# Patient Record
Sex: MALE | Race: BLACK OR AFRICAN AMERICAN | URBAN - METROPOLITAN AREA
[De-identification: names, ages, dates, MRNs, and addresses within clinical notes are randomized per-mention and may not be internally consistent; named-entity substitution may affect disease eponyms.]

---

## 2018-04-16 ENCOUNTER — OFFICE VISIT (OUTPATIENT)
Dept: INTERNAL MEDICINE CLINIC | Age: 28
End: 2018-04-16

## 2018-04-16 VITALS
OXYGEN SATURATION: 96 % | HEIGHT: 71 IN | SYSTOLIC BLOOD PRESSURE: 171 MMHG | TEMPERATURE: 98.1 F | HEART RATE: 70 BPM | RESPIRATION RATE: 16 BRPM | WEIGHT: 295.4 LBS | DIASTOLIC BLOOD PRESSURE: 101 MMHG | BODY MASS INDEX: 41.35 KG/M2

## 2018-04-16 DIAGNOSIS — E66.01 CLASS 3 SEVERE OBESITY DUE TO EXCESS CALORIES WITHOUT SERIOUS COMORBIDITY WITH BODY MASS INDEX (BMI) OF 40.0 TO 44.9 IN ADULT (HCC): ICD-10-CM

## 2018-04-16 DIAGNOSIS — Z00.00 PHYSICAL EXAM: ICD-10-CM

## 2018-04-16 DIAGNOSIS — J30.1 SEASONAL ALLERGIC RHINITIS DUE TO POLLEN: ICD-10-CM

## 2018-04-16 DIAGNOSIS — I10 HYPERTENSION, UNSPECIFIED TYPE: Primary | ICD-10-CM

## 2018-04-16 RX ORDER — AMLODIPINE BESYLATE 5 MG/1
5 TABLET ORAL DAILY
Qty: 30 TAB | Refills: 11 | Status: SHIPPED | OUTPATIENT
Start: 2018-04-16 | End: 2018-05-21 | Stop reason: SDUPTHER

## 2018-04-16 RX ORDER — FLUTICASONE PROPIONATE 50 MCG
2 SPRAY, SUSPENSION (ML) NASAL DAILY
COMMUNITY

## 2018-04-16 NOTE — PROGRESS NOTES
[facility]  [place] [address]  [address] [place] [address]  Phone:  [phone]  Fax: [phone]       Chief Complaint   Patient presents with    New Patient     Hx of HTN, questions about \"allergy shot\", establish care   . SUBJECTIVE:    Alphonso Chopra is a 24 y.o. male comes in as a new patient. His principle complaint is that of having hypertension diagnosed about 3-4 years ago. He has not taken any antihypertensive medication in the last 3-4 months. He also has a weight problem. He has been overweight since being a child. He currently weighs 295 pounds. He is reasonably physically active but obviously has dietary indiscretion as the principle etiology. Current Outpatient Prescriptions   Medication Sig Dispense Refill    fluticasone (FLONASE ALLERGY RELIEF) 50 mcg/actuation nasal spray 2 Sprays by Both Nostrils route daily.  amLODIPine (NORVASC) 5 mg tablet Take 1 Tab by mouth daily. 30 Tab 11    fexofenadine (ALLEGRA) 180 mg tablet Take  by mouth daily.  albuterol (PROVENTIL HFA, VENTOLIN HFA) 90 mcg/actuation inhaler Take 1 Puff by inhalation every four (4) hours as needed for Wheezing. 1 Inhaler 5     Past Medical History:   Diagnosis Date    Asthma     Asthma     History of seasonal allergies     HTN (hypertension)     Obesity      History reviewed. No pertinent surgical history.   No Known Allergies      REVIEW OF SYSTEMS:  General: negative for - chills or fever  ENT: negative for - headaches, nasal congestion or tinnitus  Respiratory: negative for - cough, hemoptysis, shortness of breath or wheezing  Cardiovascular : negative for - chest pain, edema, palpitations or shortness of breath  Gastrointestinal: negative for - abdominal pain, blood in stools, heartburn or nausea/vomiting  Genito-Urinary: no dysuria, trouble voiding, or hematuria  Musculoskeletal: negative for - gait disturbance, joint pain, joint stiffness or joint swelling  Neurological: no TIA or stroke symptoms  Hematologic: no bruises, no bleeding, no swollen glands  Integument: no lumps, mole changes, nail changes or rash  Endocrine: no malaise/lethargy or unexpected weight changes      Social History     Social History    Marital status: SINGLE     Spouse name: N/A    Number of children: N/A    Years of education: N/A     Social History Main Topics    Smoking status: Never Smoker    Smokeless tobacco: Never Used    Alcohol use No    Drug use: No    Sexual activity: Yes     Partners: Female     Other Topics Concern    None     Social History Narrative     Family History   Problem Relation Age of Onset    Hypertension Father     Diabetes Maternal Grandmother     Hypertension Maternal Grandmother     Heart Disease Maternal Grandfather     Hypertension Maternal Grandfather     Hypertension Paternal Grandmother     Hypertension Paternal Grandfather     Stroke Paternal Grandfather     Heart Disease Paternal Grandfather        OBJECTIVE:    Visit Vitals    BP (!) 171/101    Pulse 70    Temp 98.1 °F (36.7 °C) (Oral)    Resp 16    Ht 5' 11\" (1.803 m)    Wt 295 lb 6.4 oz (134 kg)    SpO2 96%    BMI 41.2 kg/m2     CONSTITUTIONAL: well , well nourished, appears age appropriate  EYES: perrla, eom intact  ENMT:moist mucous membranes, pharynx clear  NECK: supple. Thyroid normal  RESPIRATORY: Chest: clear to ascultation and percussion   CARDIOVASCULAR: Heart: regular rate and rhythm  GASTROINTESTINAL: Abdomen: soft, bowel sounds active  HEMATOLOGIC: no pathological lymph nodes palpated  MUSCULOSKELETAL: Extremities: no edema, pulse 1+   INTEGUMENT: No unusual rashes or suspicious skin lesions noted. Nails appear normal.  NEUROLOGIC: non-focal exam   MENTAL STATUS: alert and oriented, appropriate affect      ASSESSMENT:  1. Hypertension, unspecified type    2.  Class 3 severe obesity due to excess calories without serious comorbidity with body mass index (BMI) of 40.0 to 44.9 in adult [facility])    3. Seasonal allergic rhinitis due to pollen    4. Physical exam      Diagnoses and all orders for this visit:    1. Hypertension, unspecified type  -     amLODIPine (NORVASC) 5 mg tablet; Take 1 Tab by mouth daily. 2. Class 3 severe obesity due to excess calories without serious comorbidity with body mass index (BMI) of 40.0 to 44.9 in adult [facility])  Assessment & Plan:  Uncontrolled, based on history, physical exam and review of pertinent labs, studies and medications; meds reconciled; lifestyle modifications recommended. Key Obesity Meds     Patient is on no anti-obesity meds. Lab Results   Component Value Date/Time    Glucose 99 04/16/2018 12:07 PM    Cholesterol, total 187 04/16/2018 12:07 PM    HDL Cholesterol 45 04/16/2018 12:07 PM    LDL, calculated 109 04/16/2018 12:07 PM    Triglyceride 163 04/16/2018 12:07 PM    Sodium 140 04/16/2018 12:07 PM    Potassium 4.1 04/16/2018 12:07 PM    ALT (SGPT) 20 04/16/2018 12:07 PM    AST (SGOT) 14 04/16/2018 12:07 PM             3. Seasonal allergic rhinitis due to pollen    4. Physical exam  -     CBC WITH AUTOMATED DIFF  -     COLLECTION VENOUS BLOOD,VENIPUNCTURE  -     LIPID PANEL  -     URINALYSIS W/ RFLX MICROSCOPIC  -     METABOLIC PANEL, COMPREHENSIVE  -     MICROALBUMIN, UR, RAND W/ MICROALB/CREAT RATIO    Other orders  -     MICROSCOPIC EXAMINATION        PLAN:  The patient's blood pressure is a bit elevated. My official reading is 160/95. I will resume the amlodipine 5 mg but I do not think this is going to get him to goal.  I will see him back in the office in one month and titrate him up accordingly. He needs to lose weight. We talked about ways this can be accomplished. I emphasized the importance of eating meals, eliminating snacking and avoiding the consumption of processed carbohydrates. 3.  The patient also has an allergic rhinitis.   His symptoms are well controlled with his antihistamine currently. I contemplated adding Singulair but he is stable for now. .  Orders Placed This Encounter    CBC WITH AUTOMATED DIFF    LIPID PANEL    URINALYSIS W/ RFLX MICROSCOPIC    METABOLIC PANEL, COMPREHENSIVE    MICROALBUMIN, UR, RAND W/ MICROALB/CREAT RATIO    MICROSCOPIC EXAMINATION    fluticasone (FLONASE ALLERGY RELIEF) 50 mcg/actuation nasal spray    amLODIPine (NORVASC) 5 mg tablet         Follow-up Disposition:  Return in about 4 weeks (around 5/14/2018).       Jon Humphreys MD

## 2018-04-16 NOTE — MR AVS SNAPSHOT
[address] [place] [address]. [id number] 
[phone] Patient: Teresa Min. MRN: [id number] :1990 Visit Information Date & Time Provider Department Dept. Phone Encounter #  
 2018 11:00 AM MD Debbie Egan [facility] [phone] [id number] Follow-up Instructions Return in about 4 weeks (around 2018). Upcoming Health Maintenance Date Due DTaP/Tdap/Td series (1 - Tdap) 2013 Influenza Age 5 to Adult 2017 Pneumococcal 19-64 Medium Risk (1 of  - PPSV23) 2018* *Topic was postponed. The date shown is not the original due date. Allergies as of 2018  Review Complete On: 2014 By: Kay Figueroa No Known Allergies Current Immunizations  Reviewed on 2013 Name Date  
 TB Skin Test (PPD) Intradermal 2013 Not reviewed this visit You Were Diagnosed With   
  
 Codes Comments Class 3 severe obesity due to excess calories without serious comorbidity with body mass index (BMI) of 40.0 to 44.9 in adult [facility])    -  Primary ICD-10-CM: E66.01, Z68.41 
ICD-9-CM: 278.01, V85.41 Hypertension, unspecified type     ICD-10-CM: I10 
ICD-9-CM: 401.9 Physical exam     ICD-10-CM: Z00.00 ICD-9-CM: V70.9 Vitals BP Pulse Temp Resp Height(growth percentile) Weight(growth percentile) (!) 171/101 70 98.1 °F (36.7 °C) (Oral) 16 5' 11\" (1.803 m) 295 lb 6.4 oz (134 kg) SpO2 BMI Smoking Status 96% 41.2 kg/m2 Never Smoker Vitals History BMI and BSA Data Body Mass Index Body Surface Area  
 41.2 kg/m 2 2.59 m 2 Preferred Pharmacy Pharmacy Name Phone [id number] [address] [phone] Your Updated Medication List  
  
   
This list is accurate as of 18 12:17 PM.  Always use your most recent med list.  
  
  
  
  
 albuterol 90 mcg/actuation inhaler Commonly known as:  PROVENTIL HFA, VENTOLIN HFA, PROAIR HFA Take 1 Puff by inhalation every four (4) hours as needed for Wheezing. ALLEGRA 180 mg tablet Generic drug:  fexofenadine Take  by mouth daily. amLODIPine 5 mg tablet Commonly known as:  Eden Avel Take 1 Tab by mouth daily. FLONASE ALLERGY RELIEF 50 mcg/actuation nasal spray Generic drug:  fluticasone 2 Sprays by Both Nostrils route daily. Prescriptions Sent to Pharmacy Refills  
 amLODIPine (NORVASC) 5 mg tablet 11 Sig: Take 1 Tab by mouth daily. Class: Normal  
 Pharmacy: [address] [place] [address] #: [phone] Route: Oral  
  
We Performed the Following CBC WITH AUTOMATED DIFF [86730 CPT(R)] COLLECTION VENOUS BLOOD,VENIPUNCTURE [id number] CPT(R)] LIPID PANEL [09696 CPT(R)] METABOLIC PANEL, COMPREHENSIVE [96001 CPT(R)] MICROALBUMIN, UR, RAND W/ MICROALB/CREAT RATIO [id number] CPT(R)] URINALYSIS W/ RFLX MICROSCOPIC [19384 CPT(R)] Follow-up Instructions Return in about 4 weeks (around 5/14/2018). Introducing [facility] & HEALTH SERVICES! [facility] introduces [employer] patient portal. Now you can access parts of your medical record, email your doctor's office, and request medication refills online. 1. In your internet browser, go to [email or web address] 2. Click on the First Time User? Click Here link in the Sign In box. You will see the New Member Sign Up page. 3. Enter your [employer] Access Code exactly as it appears below. You will not need to use this code after youve completed the sign-up process. If you do not sign up before the expiration date, you must request a new code. · [employer] Access Code: [email or web address] Expires: 7/15/2018 12:17 PM 
 
4.  Enter the last four digits of your Social Security Number (xxxx) and Date of Birth (mm/dd/yyyy) as indicated and click Submit. You will be taken to the next sign-up page. 5. Create a [employer] ID. This will be your [employer] login ID and cannot be changed, so think of one that is secure and easy to remember. 6. Create a [employer] password. You can change your password at any time. 7. Enter your Password Reset Question and Answer. This can be used at a later time if you forget your password. 8. Enter your e-mail address. You will receive e-mail notification when new information is available in [address] 9. Click Sign Up. You can now view and download portions of your medical record. 10. Click the Download Summary menu link to download a portable copy of your medical information. If you have questions, please visit the Frequently Asked Questions section of the [employer] website. Remember, [employer] is NOT to be used for urgent needs. For medical emergencies, dial 911. Now available from your iPhone and Android! Please provide this summary of care documentation to your next provider. Your primary care clinician is listed as Brenden Nieto. If you have any questions after today's visit, please call [phone].

## 2018-04-16 NOTE — PROGRESS NOTES
Chief Complaint   Patient presents with    New Patient     Hx of HTN, questions about \"allergy shot\", establish care     1. Have you been to the ER, urgent care clinic since your last visit? Hospitalized since your last visit? No    2. Have you seen or consulted any other health care providers outside of the [address] [place] [address] since your last visit? Include any pap smears or colon screening.  No

## 2018-04-17 LAB
ALBUMIN SERPL-MCNC: 4.1 G/DL (ref 3.5–5.5)
ALBUMIN/CREAT UR: 11.5 MG/G CREAT (ref 0–30)
ALBUMIN/GLOB SERPL: 1.6 {RATIO} (ref 1.2–2.2)
ALP SERPL-CCNC: 84 IU/L (ref 39–117)
ALT SERPL-CCNC: 20 IU/L (ref 0–44)
APPEARANCE UR: ABNORMAL
AST SERPL-CCNC: 14 IU/L (ref 0–40)
BACTERIA #/AREA URNS HPF: ABNORMAL /[HPF]
BASOPHILS # BLD AUTO: 0 X10E3/UL (ref 0–0.2)
BASOPHILS NFR BLD AUTO: 1 %
BILIRUB SERPL-MCNC: 0.4 MG/DL (ref 0–1.2)
BILIRUB UR QL STRIP: NEGATIVE
BUN SERPL-MCNC: 8 MG/DL (ref 6–20)
BUN/CREAT SERPL: 10 (ref 9–20)
CALCIUM SERPL-MCNC: 9.2 MG/DL (ref 8.7–10.2)
CASTS URNS QL MICRO: ABNORMAL /LPF
CHLORIDE SERPL-SCNC: 101 MMOL/L (ref 96–106)
CHOLEST SERPL-MCNC: 187 MG/DL (ref 100–199)
CO2 SERPL-SCNC: 23 MMOL/L (ref 18–29)
COLOR UR: YELLOW
CREAT SERPL-MCNC: 0.79 MG/DL (ref 0.76–1.27)
CREAT UR-MCNC: 199 MG/DL
CRYSTALS URNS MICRO: ABNORMAL
EOSINOPHIL # BLD AUTO: 0.2 X10E3/UL (ref 0–0.4)
EOSINOPHIL NFR BLD AUTO: 3 %
EPI CELLS #/AREA URNS HPF: ABNORMAL /HPF
ERYTHROCYTE [DISTWIDTH] IN BLOOD BY AUTOMATED COUNT: 15 % (ref 12.3–15.4)
GFR SERPLBLD CREATININE-BSD FMLA CKD-EPI: 125 ML/MIN/1.73
GFR SERPLBLD CREATININE-BSD FMLA CKD-EPI: 144 ML/MIN/1.73
GLOBULIN SER CALC-MCNC: 2.6 G/DL (ref 1.5–4.5)
GLUCOSE SERPL-MCNC: 99 MG/DL (ref 65–99)
GLUCOSE UR QL: NEGATIVE
HCT VFR BLD AUTO: 42.2 % (ref 37.5–51)
HDLC SERPL-MCNC: 45 MG/DL
HGB BLD-MCNC: 14.4 G/DL (ref 13–17.7)
HGB UR QL STRIP: NEGATIVE
IMM GRANULOCYTES # BLD: 0 X10E3/UL (ref 0–0.1)
IMM GRANULOCYTES NFR BLD: 0 %
KETONES UR QL STRIP: NEGATIVE
LDLC SERPL CALC-MCNC: 109 MG/DL (ref 0–99)
LEUKOCYTE ESTERASE UR QL STRIP: ABNORMAL
LYMPHOCYTES # BLD AUTO: 2.3 X10E3/UL (ref 0.7–3.1)
LYMPHOCYTES NFR BLD AUTO: 39 %
MCH RBC QN AUTO: 28.4 PG (ref 26.6–33)
MCHC RBC AUTO-ENTMCNC: 34.1 G/DL (ref 31.5–35.7)
MCV RBC AUTO: 83 FL (ref 79–97)
MICRO URNS: ABNORMAL
MICROALBUMIN UR-MCNC: 22.8 UG/ML
MONOCYTES # BLD AUTO: 0.6 X10E3/UL (ref 0.1–0.9)
MONOCYTES NFR BLD AUTO: 10 %
MUCOUS THREADS URNS QL MICRO: PRESENT
NEUTROPHILS # BLD AUTO: 2.8 X10E3/UL (ref 1.4–7)
NEUTROPHILS NFR BLD AUTO: 47 %
NITRITE UR QL STRIP: NEGATIVE
PH UR STRIP: 6 [PH] (ref 5–7.5)
PLATELET # BLD AUTO: 363 X10E3/UL (ref 150–379)
POTASSIUM SERPL-SCNC: 4.1 MMOL/L (ref 3.5–5.2)
PROT SERPL-MCNC: 6.7 G/DL (ref 6–8.5)
PROT UR QL STRIP: NEGATIVE
RBC # BLD AUTO: 5.07 X10E6/UL (ref 4.14–5.8)
RBC #/AREA URNS HPF: ABNORMAL /HPF
SODIUM SERPL-SCNC: 140 MMOL/L (ref 134–144)
SP GR UR: 1.02 (ref 1–1.03)
TRIGL SERPL-MCNC: 163 MG/DL (ref 0–149)
UNIDENT CRYS URNS QL MICRO: PRESENT
UROBILINOGEN UR STRIP-MCNC: 0.2 MG/DL (ref 0.2–1)
VLDLC SERPL CALC-MCNC: 33 MG/DL (ref 5–40)
WBC # BLD AUTO: 6 X10E3/UL (ref 3.4–10.8)
WBC #/AREA URNS HPF: ABNORMAL /HPF

## 2018-04-22 PROBLEM — E66.01 CLASS 3 SEVERE OBESITY DUE TO EXCESS CALORIES WITHOUT SERIOUS COMORBIDITY WITH BODY MASS INDEX (BMI) OF 40.0 TO 44.9 IN ADULT (HCC): Status: ACTIVE | Noted: 2018-04-22

## 2018-04-22 NOTE — ASSESSMENT & PLAN NOTE
Uncontrolled, based on history, physical exam and review of pertinent labs, studies and medications; meds reconciled; lifestyle modifications recommended. Key Obesity Meds     Patient is on no anti-obesity meds.         Lab Results   Component Value Date/Time    Glucose 99 04/16/2018 12:07 PM    Cholesterol, total 187 04/16/2018 12:07 PM    HDL Cholesterol 45 04/16/2018 12:07 PM    LDL, calculated 109 04/16/2018 12:07 PM    Triglyceride 163 04/16/2018 12:07 PM    Sodium 140 04/16/2018 12:07 PM    Potassium 4.1 04/16/2018 12:07 PM    ALT (SGPT) 20 04/16/2018 12:07 PM    AST (SGOT) 14 04/16/2018 12:07 PM

## 2018-05-05 DIAGNOSIS — R97.20 ELEVATED PSA: Primary | ICD-10-CM

## 2018-05-21 ENCOUNTER — OFFICE VISIT (OUTPATIENT)
Dept: INTERNAL MEDICINE CLINIC | Age: 28
End: 2018-05-21

## 2018-05-21 DIAGNOSIS — I10 ESSENTIAL HYPERTENSION: Primary | ICD-10-CM

## 2018-05-21 DIAGNOSIS — E66.01 CLASS 3 SEVERE OBESITY DUE TO EXCESS CALORIES WITHOUT SERIOUS COMORBIDITY WITH BODY MASS INDEX (BMI) OF 40.0 TO 44.9 IN ADULT (HCC): ICD-10-CM

## 2018-05-21 DIAGNOSIS — R73.02 IMPAIRED GLUCOSE TOLERANCE: ICD-10-CM

## 2018-05-21 PROBLEM — R97.20 ELEVATED PSA: Status: RESOLVED | Noted: 2018-05-05 | Resolved: 2018-05-21

## 2018-05-21 RX ORDER — AMLODIPINE BESYLATE 10 MG/1
10 TABLET ORAL DAILY
Qty: 30 TAB | Refills: 11 | Status: SHIPPED | OUTPATIENT
Start: 2018-05-21 | End: 2019-08-28 | Stop reason: SDUPTHER

## 2018-05-21 NOTE — MR AVS SNAPSHOT
[address] [place] [address]. [address] [phone] 
[phone] Patient: Alphonso Dominguez. MRN: [id number] :1990 Visit Information Date & Time Provider Department Dept. Phone Encounter #  
 2018 11:00 AM Bora Metzger MD [employer] [facility] [id number] Upcoming Health Maintenance Date Due Pneumococcal 19-64 Medium Risk (1 of 1 - PPSV23) 2018* DTaP/Tdap/Td series (1 - Tdap) 2018* Influenza Age 5 to Adult 2018 *Topic was postponed. The date shown is not the original due date. Allergies as of 2018  Review Complete On: 2018 By: Martina Mcfarland No Known Allergies Current Immunizations  Reviewed on 2013 Name Date  
 TB Skin Test (PPD) Intradermal 2013 Not reviewed this visit Vitals BP Pulse Temp Resp Height(growth percentile) Weight(growth percentile) 159/90 71 98.2 °F (36.8 °C) (Oral) 16 5' 11\" (1.803 m) 288 lb 8 oz (130.9 kg) SpO2 BMI Smoking Status 94% 40.24 kg/m2 Never Smoker Vitals History BMI and BSA Data Body Mass Index Body Surface Area  
 40.24 kg/m 2 2.56 m 2 Preferred Pharmacy Pharmacy Name Phone [id number] [place] [phone] Your Updated Medication List  
  
   
This list is accurate as of 18 12:16 PM.  Always use your most recent med list.  
  
  
  
  
 albuterol 90 mcg/actuation inhaler Commonly known as:  PROVENTIL HFA, VENTOLIN HFA, PROAIR HFA Take 1 Puff by inhalation every four (4) hours as needed for Wheezing. ALLEGRA 180 mg tablet Generic drug:  fexofenadine Take  by mouth daily. amLODIPine 5 mg tablet Commonly known as:  Eden Avel Take 1 Tab by mouth daily. FLONASE ALLERGY RELIEF 50 mcg/actuation nasal spray Generic drug:  fluticasone 2 Sprays by Both Nostrils route daily. Introducing [facility] & HEALTH SERVICES! [employer] introduces [employer] patient portal. Now you can access parts of your medical record, email your doctor's office, and request medication refills online. 1. In your internet browser, go to [email or web address] 2. Click on the First Time User? Click Here link in the Sign In box. You will see the New Member Sign Up page. 3. Enter your [employer] Access Code exactly as it appears below. You will not need to use this code after youve completed the sign-up process. If you do not sign up before the expiration date, you must request a new code. · [employer] Access Code: [email or web address] Expires: 7/15/2018 12:17 PM 
 
4. Enter the last four digits of your Social Security Number (xxxx) and Date of Birth (mm/dd/yyyy) as indicated and click Submit. You will be taken to the next sign-up page. 5. Create a [employer] ID. This will be your [employer] login ID and cannot be changed, so think of one that is secure and easy to remember. 6. Create a [employer] password. You can change your password at any time. 7. Enter your Password Reset Question and Answer. This can be used at a later time if you forget your password. 8. Enter your e-mail address. You will receive e-mail notification when new information is available in [address] 9. Click Sign Up. You can now view and download portions of your medical record. 10. Click the Download Summary menu link to download a portable copy of your medical information. If you have questions, please visit the Frequently Asked Questions section of the [employer] website. Remember, [employer] is NOT to be used for urgent needs. For medical emergencies, dial 911. Now available from your iPhone and Android! Please provide this summary of care documentation to your next provider. Your primary care clinician is listed as Brenden Lennon  If you have any questions after today's visit, please call [phone].

## 2018-05-21 NOTE — PROGRESS NOTES
Chief Complaint   Patient presents with    Hypertension     1 month follow up    . SUBECTIVE:    Carmen Tran is a 24 y.o. male   Comes in for a return visit for follow up of his high blood pressure. He is tolerating Amlodipine quite well. He does have a history of impaired glucose tolerance based on HbA1c of 2 years ago. His lab work was excellent other than the fact that he probably is dysmetabolic based on his low HDL. There has been no meaningful weight loss. Current Outpatient Prescriptions   Medication Sig Dispense Refill    amLODIPine (NORVASC) 10 mg tablet Take 1 Tab by mouth daily. 30 Tab 11    fluticasone (FLONASE ALLERGY RELIEF) 50 mcg/actuation nasal spray 2 Sprays by Both Nostrils route daily.  fexofenadine (ALLEGRA) 180 mg tablet Take  by mouth daily.  albuterol (PROVENTIL HFA, VENTOLIN HFA) 90 mcg/actuation inhaler Take 1 Puff by inhalation every four (4) hours as needed for Wheezing. 1 Inhaler 5     Past Medical History:   Diagnosis Date    Asthma     Asthma     History of seasonal allergies     HTN (hypertension)     Obesity      History reviewed. No pertinent surgical history.   No Known Allergies    REVIEW OF SYSTEMS:  Review of Systems - Negative except   ENT ROS: negative for - headaches, hearing change, nasal congestion, oral lesions, tinnitus, visual changes or   Respiratory ROS: no cough, shortness of breath, or wheezing  Cardiovascular ROS: no chest pain or dyspnea on exertion  Gastrointestinal ROS: no abdominal pain, change in bowel habits, or black or blood  Genito-Urinary ROS: no dysuria, trouble voiding, or hematuria  Musculoskeletal ROS: negative  Neurological ROS: no TIA or stroke symptoms      Social History     Social History    Marital status: SINGLE     Spouse name: N/A    Number of children: N/A    Years of education: N/A     Social History Main Topics    Smoking status: Never Smoker    Smokeless tobacco: Never Used    Alcohol use No    Drug use: No    Sexual activity: Yes     Partners: Female     Other Topics Concern    None     Social History Narrative   r  Family History   Problem Relation Age of Onset    Hypertension Father     Diabetes Maternal Grandmother     Hypertension Maternal Grandmother     Heart Disease Maternal Grandfather     Hypertension Maternal Grandfather     Hypertension Paternal Grandmother     Hypertension Paternal Grandfather     Stroke Paternal Grandfather     Heart Disease Paternal Grandfather        OBJECTIVE:  Visit Vitals    /90  Comment: repeat 154/100    Pulse 71    Temp 98.2 °F (36.8 °C) (Oral)    Resp 16    Ht 5' 11\" (1.803 m)    Wt 288 lb 8 oz (130.9 kg)    SpO2 94%    BMI 40.24 kg/m2     ENT: perrla,  eom intact  NECK: supple. Thyroid normal, no JVD  CHEST: clear to ascultation and percussion   HEART: regular rate and rhythm  ABD: soft, bowel sounds active,   EXTREMITIES: no edema, pulse 1+  INTEGUMENT: clear      ASSESSMENT:  1. Essential hypertension    2. Class 3 severe obesity due to excess calories without serious comorbidity with body mass index (BMI) of 40.0 to 44.9 in adult ([facility] [address]    3. Impaired glucose tolerance      Diagnoses and all orders for this visit:    1. Essential hypertension  -     amLODIPine (NORVASC) 10 mg tablet; Take 1 Tab by mouth daily. 2. Class 3 severe obesity due to excess calories without serious comorbidity with body mass index (BMI) of 40.0 to 44.9 in adult [facility])  Assessment & Plan:  Uncontrolled, based on history, physical exam and review of pertinent labs, studies and medications; meds reconciled; lifestyle modifications recommended. Key Obesity Meds     Patient is on no anti-obesity meds.         Lab Results   Component Value Date/Time    Glucose 99 04/16/2018 12:07 PM    Cholesterol, total 187 04/16/2018 12:07 PM    HDL Cholesterol 45 04/16/2018 12:07 PM    LDL, calculated 109 04/16/2018 12:07 PM    Triglyceride 163 04/16/2018 12:07 PM    Sodium 140 04/16/2018 12:07 PM    Potassium 4.1 04/16/2018 12:07 PM    ALT (SGPT) 20 04/16/2018 12:07 PM    AST (SGOT) 14 04/16/2018 12:07 PM             3. Impaired glucose tolerance        PLAN:    1. His blood pressure is elevated at 158/100. Amlodipine will be increased to 10 mg every day. I do not think this will control him adequately and I suspect I will probably have to add Valsartan. I will see him back in 2 months for follow up. I reminded him of the importance of minimizing salt intake. 2.  His obesity is a major problem. He is mesomorph but he still needs to lose weight. 3.  He does have a history of impaired glucose tolerance. HbA1c will be checked on his return visit. Weight loss is emphasized. Orders Placed This Encounter    amLODIPine (NORVASC) 10 mg tablet       Follow-up Disposition:  Return in about 2 months (around 7/21/2018).       Cristóbal Finn MD

## 2018-05-21 NOTE — PROGRESS NOTES
Chief Complaint   Patient presents with    Hypertension     1 month follow up      1. Have you been to the ER, urgent care clinic since your last visit? Hospitalized since your last visit? No    2. Have you seen or consulted any other health care providers outside of the [facility] since your last visit? Include any pap smears or colon screening.  No

## 2018-05-28 VITALS
RESPIRATION RATE: 16 BRPM | DIASTOLIC BLOOD PRESSURE: 90 MMHG | TEMPERATURE: 98.2 F | BODY MASS INDEX: 40.39 KG/M2 | WEIGHT: 288.5 LBS | OXYGEN SATURATION: 94 % | SYSTOLIC BLOOD PRESSURE: 159 MMHG | HEART RATE: 71 BPM | HEIGHT: 71 IN

## 2018-09-20 ENCOUNTER — OFFICE VISIT (OUTPATIENT)
Dept: INTERNAL MEDICINE CLINIC | Age: 28
End: 2018-09-20

## 2018-09-20 VITALS
HEART RATE: 71 BPM | HEIGHT: 71 IN | TEMPERATURE: 98.2 F | BODY MASS INDEX: 39.42 KG/M2 | WEIGHT: 281.6 LBS | OXYGEN SATURATION: 93 % | DIASTOLIC BLOOD PRESSURE: 92 MMHG | SYSTOLIC BLOOD PRESSURE: 146 MMHG | RESPIRATION RATE: 20 BRPM

## 2018-09-20 DIAGNOSIS — S05.02XA ABRASION OF LEFT CORNEA, INITIAL ENCOUNTER: Primary | ICD-10-CM

## 2018-09-20 DIAGNOSIS — E66.01 CLASS 3 SEVERE OBESITY DUE TO EXCESS CALORIES WITHOUT SERIOUS COMORBIDITY WITH BODY MASS INDEX (BMI) OF 40.0 TO 44.9 IN ADULT (HCC): ICD-10-CM

## 2018-09-20 DIAGNOSIS — J45.20 MILD INTERMITTENT ASTHMA WITHOUT COMPLICATION: ICD-10-CM

## 2018-09-20 DIAGNOSIS — I10 ESSENTIAL HYPERTENSION: ICD-10-CM

## 2018-09-20 RX ORDER — ALBUTEROL SULFATE 0.83 MG/ML
2.5 SOLUTION RESPIRATORY (INHALATION)
Qty: 50 EACH | Refills: 11 | Status: SHIPPED | OUTPATIENT
Start: 2018-09-20 | End: 2020-03-28

## 2018-09-20 NOTE — MR AVS SNAPSHOT
[address] [place] [address]. [place] [address] [id number] 
[phone] Patient: Alanis Carias. MRN: [id number] :1990 Visit Information Date & Time Provider Department Dept. Phone Encounter #  
 2018  9:45 AM Chandan Farrell [facility] [phone] [id number] Follow-up Instructions Return in about 6 months (around 3/20/2019). Upcoming Health Maintenance Date Due DTaP/Tdap/Td series (1 - Tdap) 2018* Pneumococcal 19-64 Medium Risk (1 of 1 - PPSV23) 3/20/2019* Influenza Age 5 to Adult 3/31/2019* *Topic was postponed. The date shown is not the original due date. Allergies as of 2018  Review Complete On: 2018 By: Jeanmarie Mcdonald No Known Allergies Current Immunizations  Reviewed on 2013 Name Date  
 TB Skin Test (PPD) Intradermal 2013 Not reviewed this visit You Were Diagnosed With   
  
 Codes Comments Abrasion of left cornea, initial encounter    -  Primary ICD-10-CM: S05. 02XA ICD-9-CM: 918.1 Essential hypertension     ICD-10-CM: I10 
ICD-9-CM: 401.9 Class 3 severe obesity due to excess calories without serious comorbidity with body mass index (BMI) of 40.0 to 44.9 in adult [facility])     ICD-10-CM: E66.01, Z68.41 
ICD-9-CM: 278.01, V85.41 Mild intermittent asthma without complication     YBI-26-UX: J45.20 ICD-9-CM: 493.90 Vitals BP Pulse Temp Resp Height(growth percentile) Weight(growth percentile) (!) 146/92 71 98.2 °F (36.8 °C) (Oral) 20 5' 11\" (1.803 m) 281 lb 9.6 oz (127.7 kg) SpO2 BMI Smoking Status 93% 39.28 kg/m2 Never Smoker Vitals History BMI and BSA Data Body Mass Index Body Surface Area  
 39.28 kg/m 2 2.53 m 2 Preferred Pharmacy Pharmacy Name Phone [id number] [place], [address] [facility] [phone] Your Updated Medication List  
  
   
This list is accurate as of 9/20/18  1:30 PM.  Always use your most recent med list.  
  
  
  
  
 * albuterol 90 mcg/actuation inhaler Commonly known as:  PROVENTIL HFA, VENTOLIN HFA, PROAIR HFA Take 1 Puff by inhalation every four (4) hours as needed for Wheezing. * albuterol 2.5 mg /3 mL (0.083 %) nebulizer solution Commonly known as:  PROVENTIL VENTOLIN  
3 mL by Nebulization route every four (4) hours as needed for Wheezing. ALLEGRA 180 mg tablet Generic drug:  fexofenadine Take  by mouth daily. amLODIPine 10 mg tablet Commonly known as:  Wandra Bernarda Take 1 Tab by mouth daily. FLONASE ALLERGY RELIEF 50 mcg/actuation nasal spray Generic drug:  fluticasone 2 Sprays by Both Nostrils route daily. * Notice: This list has 2 medication(s) that are the same as other medications prescribed for you. Read the directions carefully, and ask your doctor or other care provider to review them with you. Prescriptions Sent to Pharmacy Refills  
 albuterol (PROVENTIL VENTOLIN) 2.5 mg /3 mL (0.083 %) nebulizer solution 11 Sig: 3 mL by Nebulization route every four (4) hours as needed for Wheezing. Class: Normal  
 Pharmacy: [address] [place] [address] #: [phone] Route: Nebulization Follow-up Instructions Return in about 6 months (around 3/20/2019). Introducing [facility]! Reji Emerson introduces [employer] patient portal. Now you can access parts of your medical record, email your doctor's office, and request medication refills online. 1. In your internet browser, go to [email or web address] 2. Click on the First Time User? Click Here link in the Sign In box. You will see the New Member Sign Up page. 3. Enter your [employer] Access Code exactly as it appears below.  You will not need to use this code after youve completed the sign-up process. If you do not sign up before the expiration date, you must request a new code. · [employer] Access Code: [email or web address] Expires: 12/19/2018  1:30 PM 
 
4. Enter the last four digits of your Social Security Number (xxxx) and Date of Birth (mm/dd/yyyy) as indicated and click Submit. You will be taken to the next sign-up page. 5. Create a [employer] ID. This will be your [employer] login ID and cannot be changed, so think of one that is secure and easy to remember. 6. Create a [employer] password. You can change your password at any time. 7. Enter your Password Reset Question and Answer. This can be used at a later time if you forget your password. 8. Enter your e-mail address. You will receive e-mail notification when new information is available in [address] 9. Click Sign Up. You can now view and download portions of your medical record. 10. Click the Download Summary menu link to download a portable copy of your medical information. If you have questions, please visit the Frequently Asked Questions section of the [employer] website. Remember, [employer] is NOT to be used for urgent needs. For medical emergencies, dial 911. Now available from your iPhone and Android! Please provide this summary of care documentation to your next provider. Your primary care clinician is listed as Brenden Nieto. If you have any questions after today's visit, please call [phone].

## 2018-09-20 NOTE — PROGRESS NOTES
Chief Complaint Patient presents with [address] [place] [address] Injury Patient states that he was scratched in the left eye with a bag on yesterday, and states that he thinks it may infected. He states that his eye burns. .   
 
SUBECTIVE: 
 
Si Sandhoff. is a 24 y.o. male Comes in for return visit stating he's done well other than having scraped his left eye yesterday while at work with a bag. It was quite uncomfortable, to the point that 3 AM this morning he called for an ophthalmological appointment. He states that his pain isn't as bad currently. There has been no visual impairment noted either. He's been taking his antihypertensive medication as prescribed. There has been no meaningful weight loss. Current Outpatient Prescriptions Medication Sig Dispense Refill  albuterol (PROVENTIL VENTOLIN) 2.5 mg /3 mL (0.083 %) nebulizer solution 3 mL by Nebulization route every four (4) hours as needed for Wheezing. 50 Each 11  
 amLODIPine (NORVASC) 10 mg tablet Take 1 Tab by mouth daily. 30 Tab 11  
 fluticasone (FLONASE ALLERGY RELIEF) 50 mcg/actuation nasal spray 2 Sprays by Both Nostrils route daily.  fexofenadine (ALLEGRA) 180 mg tablet Take  by mouth daily.  albuterol (PROVENTIL HFA, VENTOLIN HFA) 90 mcg/actuation inhaler Take 1 Puff by inhalation every four (4) hours as needed for Wheezing. 1 Inhaler 5 Past Medical History:  
Diagnosis Date  Asthma  Asthma   
 History of seasonal allergies  HTN (hypertension)  Obesity History reviewed. No pertinent surgical history. No Known Allergies REVIEW OF SYSTEMS: 
Review of Systems - Negative except ENT ROS: negative for - headaches, hearing change, nasal congestion, oral lesions, tinnitus, visual changes or Respiratory ROS: no cough, shortness of breath, or wheezing Cardiovascular ROS: no chest pain or dyspnea on exertion Gastrointestinal ROS: no abdominal pain, change in bowel habits, or black or blood Genito-Urinary ROS: no dysuria, trouble voiding, or hematuria Musculoskeletal ROS: negative Neurological ROS: no TIA or stroke symptoms Social History Social History  Marital status: SINGLE Spouse name: N/A  
 Number of children: N/A  
 Years of education: N/A Social History Main Topics  Smoking status: Never Smoker  Smokeless tobacco: Never Used  Alcohol use No  
 Drug use: No  
 Sexual activity: Yes  
  Partners: Female Other Topics Concern  None Social History Narrative  
r Family History Problem Relation Age of Onset  Hypertension Father  Diabetes Maternal Grandmother  Hypertension Maternal Grandmother  Heart Disease Maternal Grandfather  Hypertension Maternal Grandfather  Hypertension Paternal Grandmother  Hypertension Paternal Grandfather  Stroke Paternal Grandfather  Heart Disease Paternal Grandfather OBJECTIVE: 
Visit Vitals  BP (!) 146/92  Pulse 71  Temp 98.2 °F (36.8 °C) (Oral)  Resp 20  
 Ht 5' 11\" (1.803 m)  Wt 281 lb 9.6 oz (127.7 kg)  SpO2 93%  BMI 39.28 kg/m2 ENT: perrla,  eom intact NECK: supple. Thyroid normal, no JVD CHEST: clear to ascultation and percussion HEART: regular rate and rhythm ABD: soft, bowel sounds active, EXTREMITIES: no edema, pulse 1+ INTEGUMENT: clear ASSESSMENT: 
1. Abrasion of left cornea, initial encounter 2. Essential hypertension 3. Class 3 severe obesity due to excess calories without serious comorbidity with body mass index (BMI) of 40.0 to 44.9 in adult [facility]) 4. Mild intermittent asthma without complication PLAN: 
 
1. He has an apparent uncomplicated corneal abrasion. Nothing need to be done for now. If the pain gets worse he'll have to see an ophthalmologist. 
2. BP is 136/90. No adjustment will be made for today.   He continues Amlodipine 10 mg q.a.m. 
3. I encouraged him to lose weight, which can be accomplished by eating meals, eliminating snacks and avoiding consumption of processed carbs. Follow-up Disposition: 
Return in about 6 months (around 3/20/2019).  
 
 
Juan Thao MD

## 2018-09-20 NOTE — PROGRESS NOTES
Chief Complaint Patient presents with [address] [place] [address] Injury Patient states that he was scratched in the left eye with a bag on yesterday, and states that he thinks it may infected. He states that his eye burns. 1. Have you been to the ER, urgent care clinic since your last visit? Hospitalized since your last visit? No 
 
2. Have you seen or consulted any other health care providers outside of the [address] [place] [address] since your last visit? Include any pap smears or colon screening.  No

## 2018-12-07 ENCOUNTER — OFFICE VISIT (OUTPATIENT)
Dept: INTERNAL MEDICINE CLINIC | Age: 28
End: 2018-12-07

## 2018-12-07 VITALS
TEMPERATURE: 98.6 F | BODY MASS INDEX: 40.39 KG/M2 | HEART RATE: 79 BPM | DIASTOLIC BLOOD PRESSURE: 105 MMHG | SYSTOLIC BLOOD PRESSURE: 162 MMHG | HEIGHT: 71 IN | OXYGEN SATURATION: 97 % | RESPIRATION RATE: 18 BRPM | WEIGHT: 288.5 LBS

## 2018-12-07 DIAGNOSIS — E66.01 CLASS 3 SEVERE OBESITY DUE TO EXCESS CALORIES WITHOUT SERIOUS COMORBIDITY WITH BODY MASS INDEX (BMI) OF 40.0 TO 44.9 IN ADULT (HCC): ICD-10-CM

## 2018-12-07 DIAGNOSIS — J06.9 UPPER RESPIRATORY TRACT INFECTION, UNSPECIFIED TYPE: Primary | ICD-10-CM

## 2018-12-07 DIAGNOSIS — I10 ESSENTIAL HYPERTENSION: ICD-10-CM

## 2018-12-07 NOTE — PROGRESS NOTES
Chief Complaint Patient presents with  Cold Symptoms 1. Have you been to the ER, urgent care clinic since your last visit? Hospitalized since your last visit? No 
 
2. Have you seen or consulted any other health care providers outside of the [address] [place] [address] since your last visit? Include any pap smears or colon screening.  No

## 2018-12-09 NOTE — PROGRESS NOTES
Chief Complaint Patient presents with  Cold Symptoms [place] SUBECTIVE: 
 
Jennifer Moser is a 24 y.o. male Comes in for return visit complaining of upper respiratory symptoms for the last five days. He is now left with a mild sore throat, but he continues to have nasal congestion and coughing. A relative of his developed strep throat with a similar presentation. He has past history of primary hypertension, impaired glucose tolerance, as well as obesity. Current Outpatient Medications Medication Sig Dispense Refill  albuterol (PROVENTIL VENTOLIN) 2.5 mg /3 mL (0.083 %) nebulizer solution 3 mL by Nebulization route every four (4) hours as needed for Wheezing. 50 Each 11  
 amLODIPine (NORVASC) 10 mg tablet Take 1 Tab by mouth daily. 30 Tab 11  
 fluticasone (FLONASE ALLERGY RELIEF) 50 mcg/actuation nasal spray 2 Sprays by Both Nostrils route daily.  fexofenadine (ALLEGRA) 180 mg tablet Take  by mouth daily.  albuterol (PROVENTIL HFA, VENTOLIN HFA) 90 mcg/actuation inhaler Take 1 Puff by inhalation every four (4) hours as needed for Wheezing. 1 Inhaler 5 Past Medical History:  
Diagnosis Date  Asthma  Asthma   
 History of seasonal allergies  HTN (hypertension)  Obesity History reviewed. No pertinent surgical history. No Known Allergies REVIEW OF SYSTEMS: 
Review of Systems - Negative except ENT ROS: negative for - headaches, hearing change, nasal congestion, oral lesions, tinnitus, visual changes or Respiratory ROS: no cough, shortness of breath, or wheezing Cardiovascular ROS: no chest pain or dyspnea on exertion Gastrointestinal ROS: no abdominal pain, change in bowel habits, or black or blood Genito-Urinary ROS: no dysuria, trouble voiding, or hematuria Musculoskeletal ROS: negative Neurological ROS: no TIA or stroke symptoms Social History Socioeconomic History  Marital status: SINGLE Spouse name: Not on file  Number of children: Not on file  Years of education: Not on file  Highest education level: Not on file Tobacco Use  Smoking status: Never Smoker  Smokeless tobacco: Never Used Substance and Sexual Activity  Alcohol use: No  
 Drug use: No  
 Sexual activity: Yes  
  Partners: Female  
r Family History Problem Relation Age of Onset  Hypertension Father  Diabetes Maternal Grandmother  Hypertension Maternal Grandmother  Heart Disease Maternal Grandfather  Hypertension Maternal Grandfather  Hypertension Paternal Grandmother  Hypertension Paternal Grandfather  Stroke Paternal Grandfather  Heart Disease Paternal Grandfather OBJECTIVE: 
Visit Vitals BP (!) 162/105 Pulse 79 Temp 98.6 °F (37 °C) (Oral) Resp 18 Ht 5' 11\" (1.803 m) Wt 288 lb 8 oz (130.9 kg) SpO2 97% BMI 40.24 kg/m² ENT: perrla,  eom intact NECK: supple. Thyroid normal, no JVD CHEST: clear to ascultation and percussion HEART: regular rate and rhythm ABD: soft, bowel sounds active, EXTREMITIES: no edema, pulse 1+ INTEGUMENT: clear ASSESSMENT: 
1. Upper respiratory tract infection, unspecified type 2. Essential hypertension 3. Class 3 severe obesity due to excess calories without serious comorbidity with body mass index (BMI) of 40.0 to 44.9 in adult [facility]) PLAN: 
 
1. He has a simple uncomplicated URI. This should resolve over the next 3-4 days. Strep screen was done and is negative. 2. His blood pressure is acceptable today, no adjustments are made. 3. I again encouraged him to lose weight. This can be accomplished by eating meals, eliminating snacks and avoiding the consumption of processed carbohydrates. Follow-up Disposition: 
Return keep old apt.  
 
 
Ricci Rosales MD

## 2019-04-29 ENCOUNTER — OFFICE VISIT (OUTPATIENT)
Dept: INTERNAL MEDICINE CLINIC | Age: 29
End: 2019-04-29

## 2019-04-29 VITALS
DIASTOLIC BLOOD PRESSURE: 94 MMHG | TEMPERATURE: 98.2 F | SYSTOLIC BLOOD PRESSURE: 150 MMHG | HEART RATE: 81 BPM | OXYGEN SATURATION: 96 % | BODY MASS INDEX: 39.23 KG/M2 | WEIGHT: 281.3 LBS

## 2019-04-29 DIAGNOSIS — Z00.00 PHYSICAL EXAM: ICD-10-CM

## 2019-04-29 DIAGNOSIS — J45.20 MILD INTERMITTENT ASTHMA WITHOUT COMPLICATION: ICD-10-CM

## 2019-04-29 DIAGNOSIS — I48.19 PERSISTENT ATRIAL FIBRILLATION (HCC): ICD-10-CM

## 2019-04-29 DIAGNOSIS — R73.02 IMPAIRED GLUCOSE TOLERANCE: ICD-10-CM

## 2019-04-29 DIAGNOSIS — E66.01 CLASS 3 SEVERE OBESITY DUE TO EXCESS CALORIES WITHOUT SERIOUS COMORBIDITY WITH BODY MASS INDEX (BMI) OF 40.0 TO 44.9 IN ADULT (HCC): ICD-10-CM

## 2019-04-29 DIAGNOSIS — I10 ESSENTIAL HYPERTENSION: Primary | ICD-10-CM

## 2019-04-29 DIAGNOSIS — E78.5 DYSLIPIDEMIA: ICD-10-CM

## 2019-04-29 NOTE — PROGRESS NOTES
Chief Complaint Patient presents with  Chest Pain Patient is here for chest pain. Per patient he has been having chest pain for 3 weeks and it feels like his heart is beating out of his chest. Per patient he missed 2days for Bp medication. (Sat-Sun) 1. Have you been to the ER, urgent care clinic since your last visit? Hospitalized since your last visit? No 
 
2. Have you seen or consulted any other health care providers outside of the [address] [place] [address] since your last visit? Include any pap smears or colon screening.  No

## 2019-04-29 NOTE — LETTER
NOTIFICATION RETURN TO WORK / SCHOOL 
 
4/29/2019 12:23 PM 
 
Mr. Omar Vargas [facility] [phone] To Whom It May Concern: 
 
Omar Vargas is currently under the care of Mayra Sandra 04/29/2019 He will return to work/school on:04/30/2019 If there are questions or concerns please have the patient contact our office. Sincerely, Matt Ramirez MD

## 2019-04-29 NOTE — PROGRESS NOTES
[facility] 
[place] [address] 
[address] [place] [address] [address] Phone:  [phone]  Fax: [phone] Chief Complaint Patient presents with  Chest Pain Patient is here for chest pain. Per patient he has been having chest pain for 3 weeks and it feels like his heart is beating out of his chest. Per patient he missed 2days for Bp medication. (Sat-Sun) [place]  SUBJECTIVE: 
  Mónica Tompkins. is a 34 y.o. male Comes in for return visit, having developed chest pain yesterday. It was rather nonspecific and abated fairly rapidly. He has been dieting, but has been consuming too much sugar with excessive fruit intake, but he is making an effort and has indeed lost 14-15 lbs over the last month. He has a past history of primary hypertension and asthma. He continues to work on a regular basis. Current Outpatient Medications Medication Sig Dispense Refill  apixaban (ELIQUIS) 5 mg tablet Take 1 Tab by mouth two (2) times a day. 60 Tab 3  
 albuterol (PROVENTIL VENTOLIN) 2.5 mg /3 mL (0.083 %) nebulizer solution 3 mL by Nebulization route every four (4) hours as needed for Wheezing. 50 Each 11  
 amLODIPine (NORVASC) 10 mg tablet Take 1 Tab by mouth daily. 30 Tab 11  
 fluticasone (FLONASE ALLERGY RELIEF) 50 mcg/actuation nasal spray 2 Sprays by Both Nostrils route daily.  fexofenadine (ALLEGRA) 180 mg tablet Take  by mouth daily.  albuterol (PROVENTIL HFA, VENTOLIN HFA) 90 mcg/actuation inhaler Take 1 Puff by inhalation every four (4) hours as needed for Wheezing. 1 Inhaler 5 Past Medical History:  
Diagnosis Date  Asthma  Asthma   
 History of seasonal allergies  HTN (hypertension)  Obesity History reviewed. No pertinent surgical history. No Known Allergies REVIEW OF SYSTEMS: 
General: negative for - chills or fever ENT: negative for - headaches, nasal congestion or tinnitus Respiratory: negative for - cough, hemoptysis, shortness of breath or wheezing Cardiovascular : negative for - chest pain, edema, palpitations or shortness of breath Gastrointestinal: negative for - abdominal pain, blood in stools, heartburn or nausea/vomiting Genito-Urinary: no dysuria, trouble voiding, or hematuria Musculoskeletal: negative for - gait disturbance, joint pain, joint stiffness or joint swelling Neurological: no TIA or stroke symptoms Hematologic: no bruises, no bleeding, no swollen glands Integument: no lumps, mole changes, nail changes or rash Endocrine: no malaise/lethargy or unexpected weight changes Social History Socioeconomic History  Marital status: SINGLE Spouse name: Not on file  Number of children: Not on file  Years of education: Not on file  Highest education level: Not on file Tobacco Use  Smoking status: Never Smoker  Smokeless tobacco: Never Used Substance and Sexual Activity  Alcohol use: No  
 Drug use: No  
 Sexual activity: Yes  
  Partners: Female Family History Problem Relation Age of Onset  Hypertension Father  Diabetes Maternal Grandmother  Hypertension Maternal Grandmother  Heart Disease Maternal Grandfather  Hypertension Maternal Grandfather  Hypertension Paternal Grandmother  Hypertension Paternal Grandfather  Stroke Paternal Grandfather  Heart Disease Paternal Grandfather OBJECTIVE: 
 
Visit Vitals BP (!) 150/94 Comment: 120/95 Pulse 81 Temp 98.2 °F (36.8 °C) Wt 281 lb 4.8 oz (127.6 kg) SpO2 96% BMI 39.23 kg/m² CONSTITUTIONAL: well , well nourished, appears age appropriate EYES: perrla, eom intact ENMT:moist mucous membranes, pharynx clear NECK: supple. Thyroid normal 
RESPIRATORY: Chest: clear to ascultation and percussion CARDIOVASCULAR: Heart: regular rate and rhythm GASTROINTESTINAL: Abdomen: soft, bowel sounds active HEMATOLOGIC: no pathological lymph nodes palpated MUSCULOSKELETAL: Extremities: no edema, pulse 1+ INTEGUMENT: No unusual rashes or suspicious skin lesions noted. Nails appear normal. 
NEUROLOGIC: non-focal exam  
MENTAL STATUS: alert and oriented, appropriate affect ASSESSMENT: 
1. Essential hypertension 2. Impaired glucose tolerance 3. Mild intermittent asthma without complication 4. Class 3 severe obesity due to excess calories without serious comorbidity with body mass index (BMI) of 40.0 to 44.9 in adult [facility]) 5. Dyslipidemia 6. Persistent atrial fibrillation ([facility] [address] 7. Physical exam   
 
 
PLAN: 
 
1. Blood pressure is better, although not entirely normal.  The systolic is normal, but diastolic is elevated. Ideally I need to increase the Amlodipine, but I will do so on a return visit if pressure remains elevated. 2. He also has a history of impaired glucose tolerance and I will await the results of his hemoglobin A1c. 
3. He has intermittent bronchospasm, but this is stable. 4. He needs to lose weight as we have discussed on numerous occasions before. He is making an active effort, but is consuming too much fruit. 5. He also has slight elevation in cholesterol, but no intervention for now, particularly given his age. 6. When I auscultated his heart his rhythm was somewhat irregular. An EKG was therefore done, surprisingly demonstrating atrial fibrillation. He will be referred to a cardiologist and I will empirically start him on a DOAC. . 
Orders Placed This Encounter  HEMOGLOBIN A1C WITH EAG  
 CBC WITH AUTOMATED DIFF  
 LIPID PANEL  
 METABOLIC PANEL, COMPREHENSIVE  
 URINALYSIS W/ RFLX MICROSCOPIC  APOLIPOPROTEIN B  
 [facility] POC EKG ROUTINE W/ 12 LEADS, INTER & REP  
 apixaban (ELIQUIS) 5 mg tablet Follow-up and Dispositions · Return in about 1 month (around 5/27/2019).  
  
 
 
 
Cali Garay MD

## 2019-04-30 LAB
ALBUMIN SERPL-MCNC: 4.4 G/DL (ref 3.5–5.5)
ALBUMIN/GLOB SERPL: 1.6 {RATIO} (ref 1.2–2.2)
ALP SERPL-CCNC: 108 IU/L (ref 39–117)
ALT SERPL-CCNC: 37 IU/L (ref 0–44)
APO B SERPL-MCNC: 108 MG/DL
APPEARANCE UR: CLEAR
AST SERPL-CCNC: 22 IU/L (ref 0–40)
BASOPHILS # BLD AUTO: 0 X10E3/UL (ref 0–0.2)
BASOPHILS NFR BLD AUTO: 1 %
BILIRUB SERPL-MCNC: 0.4 MG/DL (ref 0–1.2)
BILIRUB UR QL STRIP: NEGATIVE
BUN SERPL-MCNC: 8 MG/DL (ref 6–20)
BUN/CREAT SERPL: 10 (ref 9–20)
CALCIUM SERPL-MCNC: 9.4 MG/DL (ref 8.7–10.2)
CHLORIDE SERPL-SCNC: 104 MMOL/L (ref 96–106)
CHOLEST SERPL-MCNC: 192 MG/DL (ref 100–199)
CO2 SERPL-SCNC: 25 MMOL/L (ref 20–29)
COLOR UR: YELLOW
CREAT SERPL-MCNC: 0.8 MG/DL (ref 0.76–1.27)
EOSINOPHIL # BLD AUTO: 0.1 X10E3/UL (ref 0–0.4)
EOSINOPHIL NFR BLD AUTO: 2 %
ERYTHROCYTE [DISTWIDTH] IN BLOOD BY AUTOMATED COUNT: 14.8 % (ref 12.3–15.4)
EST. AVERAGE GLUCOSE BLD GHB EST-MCNC: 114 MG/DL
GLOBULIN SER CALC-MCNC: 2.8 G/DL (ref 1.5–4.5)
GLUCOSE SERPL-MCNC: 94 MG/DL (ref 65–99)
GLUCOSE UR QL: NEGATIVE
HBA1C MFR BLD: 5.6 % (ref 4.8–5.6)
HCT VFR BLD AUTO: 44.8 % (ref 37.5–51)
HDLC SERPL-MCNC: 49 MG/DL
HGB BLD-MCNC: 15.4 G/DL (ref 13–17.7)
HGB UR QL STRIP: NEGATIVE
IMM GRANULOCYTES # BLD AUTO: 0 X10E3/UL (ref 0–0.1)
IMM GRANULOCYTES NFR BLD AUTO: 0 %
KETONES UR QL STRIP: NEGATIVE
LDLC SERPL CALC-MCNC: 125 MG/DL (ref 0–99)
LEUKOCYTE ESTERASE UR QL STRIP: NEGATIVE
LYMPHOCYTES # BLD AUTO: 2.6 X10E3/UL (ref 0.7–3.1)
LYMPHOCYTES NFR BLD AUTO: 45 %
MCH RBC QN AUTO: 29.3 PG (ref 26.6–33)
MCHC RBC AUTO-ENTMCNC: 34.4 G/DL (ref 31.5–35.7)
MCV RBC AUTO: 85 FL (ref 79–97)
MICRO URNS: NORMAL
MONOCYTES # BLD AUTO: 0.5 X10E3/UL (ref 0.1–0.9)
MONOCYTES NFR BLD AUTO: 9 %
NEUTROPHILS # BLD AUTO: 2.4 X10E3/UL (ref 1.4–7)
NEUTROPHILS NFR BLD AUTO: 43 %
NITRITE UR QL STRIP: NEGATIVE
PH UR STRIP: 6.5 [PH] (ref 5–7.5)
PLATELET # BLD AUTO: 346 X10E3/UL (ref 150–379)
POTASSIUM SERPL-SCNC: 4.2 MMOL/L (ref 3.5–5.2)
PROT SERPL-MCNC: 7.2 G/DL (ref 6–8.5)
PROT UR QL STRIP: NEGATIVE
RBC # BLD AUTO: 5.26 X10E6/UL (ref 4.14–5.8)
SODIUM SERPL-SCNC: 142 MMOL/L (ref 134–144)
SP GR UR: 1.02 (ref 1–1.03)
TRIGL SERPL-MCNC: 89 MG/DL (ref 0–149)
UROBILINOGEN UR STRIP-MCNC: 0.2 MG/DL (ref 0.2–1)
VLDLC SERPL CALC-MCNC: 18 MG/DL (ref 5–40)
WBC # BLD AUTO: 5.7 X10E3/UL (ref 3.4–10.8)

## 2019-05-10 ENCOUNTER — OFFICE VISIT (OUTPATIENT)
Dept: CARDIOLOGY CLINIC | Age: 29
End: 2019-05-10

## 2019-05-10 VITALS
BODY MASS INDEX: 39.34 KG/M2 | HEIGHT: 71 IN | HEART RATE: 64 BPM | OXYGEN SATURATION: 97 % | DIASTOLIC BLOOD PRESSURE: 88 MMHG | SYSTOLIC BLOOD PRESSURE: 140 MMHG | RESPIRATION RATE: 18 BRPM | WEIGHT: 281 LBS

## 2019-05-10 DIAGNOSIS — R73.02 IMPAIRED GLUCOSE TOLERANCE: ICD-10-CM

## 2019-05-10 DIAGNOSIS — I48.0 PAROXYSMAL ATRIAL FIBRILLATION (HCC): Primary | ICD-10-CM

## 2019-05-10 DIAGNOSIS — I10 ESSENTIAL HYPERTENSION: ICD-10-CM

## 2019-05-10 DIAGNOSIS — J45.20 MILD INTERMITTENT ASTHMA WITHOUT COMPLICATION: ICD-10-CM

## 2019-05-10 DIAGNOSIS — E66.01 CLASS 2 SEVERE OBESITY DUE TO EXCESS CALORIES WITH SERIOUS COMORBIDITY AND BODY MASS INDEX (BMI) OF 35.0 TO 35.9 IN ADULT (HCC): ICD-10-CM

## 2019-05-10 NOTE — PROGRESS NOTES
[facility]   [address] [facility], [address]                                          NEW PATIENT HPI/FOLLOW-UP      NAME:  Darletta Primrose. :   1990   MRN:   [id number]   PCP:  Davy Crooks MD           Subjective: The patient is a 34y.o. year old male,non-smoker with PMHx of severe obesity attempting to lose weight on diet, HTN since early , prediabetes, asthma who presents for newly discovered PAFib 19 at visit with PCP when EKG was obtained because of auscultation of irregular rhythm on physical exam.  Patient at the time gave hx of intermittent rapid irregular rapid palpitations intermittently on and off for several weeks primarily when active. Was prophylactically started on Eliquis. Admit to excessive sugar intake incorporated into dieting. Much stress at work as manager. Denies change in exercise tolerance, outright exertional chest pain, edema, medication intolerance, shortness of breath, PND/orthopnea wheezing, sputum, syncope, dizziness or light headedness. Review of Systems  General: Pt denies excessive weight gain or loss. Pt is able to conduct ADL's. Respiratory: Denies shortness of breath, ROLAND, wheezing or stridor.   Cardiovascular: Denies precordial pain, +palpitations, edema or PND  Gastrointestinal: Denies poor appetite, indigestion, abdominal pain or blood in stool  Peripheral vascular: Denies claudication, leg cramps  Neuropsychiatric: Denies paresthesias,tingling,numbness,+  anxiety,depression,fatigue  Musculoskeletal: Denies pain,tenderness, soreness,swelling      Past Medical History:   Diagnosis Date    Asthma     Asthma     History of seasonal allergies     HTN (hypertension)     Obesity      Patient Active Problem List    Diagnosis Date Noted    Persistent atrial fibrillation ([facility] 2019    Impaired glucose tolerance 2018    Class 3 severe obesity due to excess calories without serious comorbidity with body mass index (BMI) of 40.0 to 44.9 in adult [facility]) 04/22/2018    Asthma 09/17/2012    HTN (hypertension) 09/17/2012      No past surgical history on file.   No Known Allergies   Family History   Problem Relation Age of Onset    Hypertension Father     Diabetes Maternal Grandmother     Hypertension Maternal Grandmother     Heart Disease Maternal Grandfather     Hypertension Maternal Grandfather     Hypertension Paternal Grandmother     Hypertension Paternal Grandfather     Stroke Paternal Grandfather     Heart Disease Paternal Grandfather       Social History     Socioeconomic History    Marital status: SINGLE     Spouse name: Not on file    Number of children: Not on file    Years of education: Not on file    Highest education level: Not on file   Occupational History    Not on file   Social Needs    Financial resource strain: Not on file    Food insecurity:     Worry: Not on file     Inability: Not on file    Transportation needs:     Medical: Not on file     Non-medical: Not on file   Tobacco Use    Smoking status: Never Smoker    Smokeless tobacco: Never Used   Substance and Sexual Activity    Alcohol use: No    Drug use: No    Sexual activity: Yes     Partners: Female   Lifestyle    Physical activity:     Days per week: Not on file     Minutes per session: Not on file    Stress: Not on file   Relationships    Social connections:     Talks on phone: Not on file     Gets together: Not on file     Attends Muslim service: Not on file     Active member of club or organization: Not on file     Attends meetings of clubs or organizations: Not on file     Relationship status: Not on file    Intimate partner violence:     Fear of current or ex partner: Not on file     Emotionally abused: Not on file     Physically abused: Not on file     Forced sexual activity: Not on file   Other Topics Concern    Not on file   Social History Narrative    Not on file      Current Outpatient Medications   Medication Sig    apixaban (ELIQUIS) 5 mg tablet Take 1 Tab by mouth two (2) times a day.  albuterol (PROVENTIL VENTOLIN) 2.5 mg /3 mL (0.083 %) nebulizer solution 3 mL by Nebulization route every four (4) hours as needed for Wheezing.  amLODIPine (NORVASC) 10 mg tablet Take 1 Tab by mouth daily.  fluticasone (FLONASE ALLERGY RELIEF) 50 mcg/actuation nasal spray 2 Sprays by Both Nostrils route daily.  fexofenadine (ALLEGRA) 180 mg tablet Take  by mouth daily.  albuterol (PROVENTIL HFA, VENTOLIN HFA) 90 mcg/actuation inhaler Take 1 Puff by inhalation every four (4) hours as needed for Wheezing. No current facility-administered medications for this visit. I have reviewed the nurses notes, vitals, problem list, allergy list, medical history, family medical, social history and medications. Objective:     Physical Exam:     Vitals:    05/10/19 1427 05/10/19 1437   BP: 140/69 140/88   Pulse: 64    Resp: 18    SpO2: 97%    Weight: 281 lb (127.5 kg)    Height: 5' 11\" (1.803 m)     Body mass index is 39.19 kg/m². General: Well developed, in no acute distress. HEENT: No carotid bruits, no JVD, trach is midline. Heart:  Normal S1/S2 negative S3 or S4. Regular, soft Gr 1/6 systolic murmur, gallop or rub.   Respiratory: Clear bilaterally, no wheezing or rales  Abdomen:   Soft, non-tender, bowel sounds are active.   Extremities:  No edema, normal cap refill, no cyanosis. Neuro: A&Ox3, speech clear, gait stable. Skin: Skin color is normal. No rashes or lesions. No diaphoresis. Vascular: 2+ pulses symmetric in all extremities        Data Review:       Cardiographics:    EKG:  Borderline SB,otherwise normal    Cardiology Labs:    No results found for this or any previous visit.     Lab Results   Component Value Date/Time    Cholesterol, total 192 04/29/2019 12:09 PM    HDL Cholesterol 49 04/29/2019 12:09 PM    LDL, calculated 125 (H) 04/29/2019 12:09 PM    Triglyceride 89 04/29/2019 12:09 PM       Lab Results   Component Value Date/Time    Sodium 142 04/29/2019 12:09 PM    Potassium 4.2 04/29/2019 12:09 PM    Chloride 104 04/29/2019 12:09 PM    CO2 25 04/29/2019 12:09 PM    Glucose 94 04/29/2019 12:09 PM    BUN 8 04/29/2019 12:09 PM    Creatinine 0.80 04/29/2019 12:09 PM    BUN/Creatinine ratio 10 04/29/2019 12:09 PM    GFR est  04/29/2019 12:09 PM    GFR est non- 04/29/2019 12:09 PM    Calcium 9.4 04/29/2019 12:09 PM    Bilirubin, total 0.4 04/29/2019 12:09 PM    AST (SGOT) 22 04/29/2019 12:09 PM    Alk. phosphatase 108 04/29/2019 12:09 PM    Protein, total 7.2 04/29/2019 12:09 PM    Albumin 4.4 04/29/2019 12:09 PM    A-G Ratio 1.6 04/29/2019 12:09 PM    ALT (SGPT) 37 04/29/2019 12:09 PM          Assessment:       ICD-10-CM ICD-9-CM    1. Paroxysmal atrial fibrillation (HCC) I48.0 427.31 AMB POC EKG ROUTINE W/ 12 LEADS, INTER & REP   2. Impaired glucose tolerance R73.02 790.22    3. Mild intermittent asthma without complication Z29.95 587.64    4. Essential hypertension I10 401.9 AMB POC EKG ROUTINE W/ 12 LEADS, INTER & REP   5. Class 2 severe obesity due to excess calories with serious comorbidity and body mass index (BMI) of 35.0 to 35.9 in adult [facility]) E66.01 278.01     Z68.35 V85.35          Discussion: Patient presents at this time with symptomatic new onset PAFib. EKG today reveals SB. Is on Eliquis--CHADS Vasc 1 at least. Will leave on til EP evaluation. Meanwhile, echo and routine stress test as CAD risk low. Mother and Father here with him. Discussed approach in detail. Pleased with present cardiac status. Plan: 1. Continue same meds. Lipid profile and labs followed by PCP. 2.Encouraged to exercise to tolerance, lose weight and follow low fat, low carb,low cholesterol, low sodium predominantly Plant-based (consider Mediterranean) diet. Call with questions or concerns. Will follow up any test results by phone and/or f/u here in office if needed. Nichelle Sanchez 3.Follow up: 1 month    I have discussed the diagnosis with the patient and the intended plan as seen in the above orders. The patient has received an after-visit summary and questions were answered concerning future plans. I have discussed any concerning medication side effects and warnings with the patient as well.     Brannon Moreno MD  5/10/2019

## 2019-05-10 NOTE — PROGRESS NOTES
Chief Complaint   Patient presents with    New Patient    Hypertension    Irregular Heart Beat     1. Have you been to the ER, urgent care clinic since your last visit? Hospitalized since your last visit? No    2. Have you seen or consulted any other health care providers outside of the [address] [place] [address] since your last visit? Include any pap smears or colon screening.  No

## 2019-05-28 ENCOUNTER — OFFICE VISIT (OUTPATIENT)
Dept: INTERNAL MEDICINE CLINIC | Age: 29
End: 2019-05-28

## 2019-05-28 ENCOUNTER — TELEPHONE (OUTPATIENT)
Dept: CARDIOLOGY CLINIC | Age: 29
End: 2019-05-28

## 2019-05-28 VITALS
BODY MASS INDEX: 40.12 KG/M2 | HEIGHT: 71 IN | OXYGEN SATURATION: 83 % | DIASTOLIC BLOOD PRESSURE: 91 MMHG | TEMPERATURE: 98.6 F | SYSTOLIC BLOOD PRESSURE: 135 MMHG | HEART RATE: 83 BPM | WEIGHT: 286.6 LBS

## 2019-05-28 DIAGNOSIS — I10 ESSENTIAL HYPERTENSION: ICD-10-CM

## 2019-05-28 DIAGNOSIS — E66.01 CLASS 3 SEVERE OBESITY DUE TO EXCESS CALORIES WITHOUT SERIOUS COMORBIDITY WITH BODY MASS INDEX (BMI) OF 40.0 TO 44.9 IN ADULT (HCC): ICD-10-CM

## 2019-05-28 DIAGNOSIS — R73.02 IMPAIRED GLUCOSE TOLERANCE: ICD-10-CM

## 2019-05-28 DIAGNOSIS — I48.0 PAROXYSMAL ATRIAL FIBRILLATION (HCC): Primary | ICD-10-CM

## 2019-05-28 NOTE — TELEPHONE ENCOUNTER
Spoke with patient. Verified patient with two patient identifiers. Advised EST normal.  Patient verbalized understanding. stress test   Received: 3 days ago   Message Contents   Sherry Ortiz MD sent to Baldemar Burns LPN   Cc: Shamir Heard MD             Normal     thx Sir! !      B

## 2019-05-28 NOTE — PROGRESS NOTES
Chief Complaint   Patient presents with    Hypertension     Patient is here for 1 month follow up. 1. Have you been to the ER, urgent care clinic since your last visit? Hospitalized since your last visit? No    2. Have you seen or consulted any other health care providers outside of the [address] [place] [address] since your last visit? Include any pap smears or colon screening.  No

## 2019-05-28 NOTE — PROGRESS NOTES
[address] [place] [address] and Primary Care  [place] [address]  [address] [place] [address]  Phone:  [phone]  Fax: [phone]       Chief Complaint   Patient presents with    Hypertension     Patient is here for 1 month follow up. .      SUBJECTIVE:    Elizabeth Chopra. is a 34 y.o. male Comes in for return visit having gone to see the cardiologist.  He concurred with everything being done and suggested stress test, echo and apparent EP studies. The patient did note the night before his visit to see me that he had an increasing heart rate only. There has been no meaningful weight loss. He does have a family history of diabetes mellitus. He has not resumed his exercising. Current Outpatient Medications   Medication Sig Dispense Refill    apixaban (ELIQUIS) 5 mg tablet Take 1 Tab by mouth two (2) times a day. 60 Tab 3    albuterol (PROVENTIL VENTOLIN) 2.5 mg /3 mL (0.083 %) nebulizer solution 3 mL by Nebulization route every four (4) hours as needed for Wheezing. 50 Each 11    amLODIPine (NORVASC) 10 mg tablet Take 1 Tab by mouth daily. 30 Tab 11    fluticasone (FLONASE ALLERGY RELIEF) 50 mcg/actuation nasal spray 2 Sprays by Both Nostrils route daily.  fexofenadine (ALLEGRA) 180 mg tablet Take  by mouth daily.  albuterol (PROVENTIL HFA, VENTOLIN HFA) 90 mcg/actuation inhaler Take 1 Puff by inhalation every four (4) hours as needed for Wheezing. 1 Inhaler 5     Past Medical History:   Diagnosis Date    Asthma     Asthma     History of seasonal allergies     HTN (hypertension)     Obesity      History reviewed. No pertinent surgical history.   No Known Allergies      REVIEW OF SYSTEMS:  General: negative for - chills or fever  ENT: negative for - headaches, nasal congestion or tinnitus  Respiratory: negative for - cough, hemoptysis, shortness of breath or wheezing  Cardiovascular : negative for - chest pain, edema, palpitations or shortness of breath  Gastrointestinal: negative for - abdominal pain, blood in stools, heartburn or nausea/vomiting  Genito-Urinary: no dysuria, trouble voiding, or hematuria  Musculoskeletal: negative for - gait disturbance, joint pain, joint stiffness or joint swelling  Neurological: no TIA or stroke symptoms  Hematologic: no bruises, no bleeding, no swollen glands  Integument: no lumps, mole changes, nail changes or rash  Endocrine: no malaise/lethargy or unexpected weight changes      Social History     Socioeconomic History    Marital status: SINGLE     Spouse name: Not on file    Number of children: Not on file    Years of education: Not on file    Highest education level: Not on file   Tobacco Use    Smoking status: Never Smoker    Smokeless tobacco: Never Used   Substance and Sexual Activity    Alcohol use: No    Drug use: No    Sexual activity: Yes     Partners: Female     Family History   Problem Relation Age of Onset    Hypertension Father     Diabetes Maternal Grandmother     Hypertension Maternal Grandmother     Heart Disease Maternal Grandfather     Hypertension Maternal Grandfather     Hypertension Paternal Grandmother     Hypertension Paternal Grandfather     Stroke Paternal Grandfather     Heart Disease Paternal Grandfather        OBJECTIVE:    Visit Vitals  BP (!) 135/91   Pulse 83   Temp 98.6 °F (37 °C)   Ht 5' 11\" (1.803 m)   Wt 286 lb 9.6 oz (130 kg)   SpO2 (!) 83%   BMI 39.97 kg/m²     CONSTITUTIONAL: well , well nourished, appears age appropriate  EYES: perrla, eom intact  ENMT:moist mucous membranes, pharynx clear  NECK: supple. Thyroid normal  RESPIRATORY: Chest: clear to ascultation and percussion   CARDIOVASCULAR: Heart: regular rate and rhythm  GASTROINTESTINAL: Abdomen: soft, bowel sounds active  HEMATOLOGIC: no pathological lymph nodes palpated  MUSCULOSKELETAL: Extremities: no edema, pulse 1+   INTEGUMENT: No unusual rashes or suspicious skin lesions noted.  Nails appear normal.  NEUROLOGIC: non-focal exam   MENTAL STATUS: alert and oriented, appropriate affect      ASSESSMENT:  1. Paroxysmal atrial fibrillation (HCC)    2. Essential hypertension    3. Class 3 severe obesity due to excess calories without serious comorbidity with body mass index (BMI) of 40.0 to 44.9 in adult ([address]    4. Impaired glucose tolerance        PLAN:    1. The patient appears to have a regular rhythm today. This therefore means he has paroxysmal atrial fib. He will continue follow up with cardiology until all studies are done. 2. BP is acceptable. The echocardiogram demonstrates LVH, more aggressive treatment of his BP has to occur. 3. I encouraged weight reduction. We talk about this repetitively. It is important that he eat meals, eliminate snacks and avoid the consumption of processed carbs. 4. Finally, he has a history of IGT and weight reduction will reduce the likelihood of the potential development of this entity. Follow-up and Dispositions    · Return in about 3 months (around 8/28/2019).            Malini Mcmillan MD

## 2019-05-31 ENCOUNTER — TELEPHONE (OUTPATIENT)
Dept: CARDIOLOGY CLINIC | Age: 29
End: 2019-05-31

## 2019-05-31 NOTE — TELEPHONE ENCOUNTER
Sharmaine Newton MD  [facility] SHWETA Nguyen             NORMAL STRESS TEST     B

## 2019-05-31 NOTE — TELEPHONE ENCOUNTER
I called and spoke with the patient. I informed him that Dr. Pearl Alvarez wanted me to call you with the results of your stress test. It was normal. He acknowledged understanding and thanked me for the call.

## 2019-06-07 ENCOUNTER — TELEPHONE (OUTPATIENT)
Dept: CARDIOLOGY CLINIC | Age: 29
End: 2019-06-07

## 2019-06-07 NOTE — TELEPHONE ENCOUNTER
I spoke with the patient and informed him his Echo per Dr. Cait Mathis was essentially normal. The patient asked, \"So where do we go from here? \" I did inform him that Dr. Cait Mathis will return on the 18th and he should respond at which time I will call you. He acknowledged understanding and thanked me for the call.

## 2019-06-14 NOTE — TELEPHONE ENCOUNTER
Haritha Britt,     We discussed evaluation by EP with him and family. .. His stress test and echo unremarkable. Rosalie Payne Would you alert him he will be getting a call     Let me see back in 1 month after G sees!  thx

## 2019-06-14 NOTE — TELEPHONE ENCOUNTER
I did speak with the patient. I informed him that Dr Jasmin Espinoza would like for him to see Dr. Odalys Huston at our other office. Once you have seen Dr. Odalys Huston please call us to schedule a follow up with Dr. Jasmin Espinoza. He acknowledged understanding and thanked me for the call.

## 2019-06-20 ENCOUNTER — OFFICE VISIT (OUTPATIENT)
Dept: CARDIOLOGY CLINIC | Age: 29
End: 2019-06-20

## 2019-06-20 VITALS
WEIGHT: 289.6 LBS | HEART RATE: 76 BPM | HEIGHT: 71 IN | RESPIRATION RATE: 16 BRPM | OXYGEN SATURATION: 97 % | SYSTOLIC BLOOD PRESSURE: 120 MMHG | BODY MASS INDEX: 40.54 KG/M2 | DIASTOLIC BLOOD PRESSURE: 80 MMHG

## 2019-06-20 DIAGNOSIS — E66.01 CLASS 3 SEVERE OBESITY DUE TO EXCESS CALORIES WITHOUT SERIOUS COMORBIDITY WITH BODY MASS INDEX (BMI) OF 40.0 TO 44.9 IN ADULT (HCC): ICD-10-CM

## 2019-06-20 DIAGNOSIS — I48.0 PAROXYSMAL ATRIAL FIBRILLATION (HCC): Primary | ICD-10-CM

## 2019-06-20 DIAGNOSIS — I10 ESSENTIAL HYPERTENSION: ICD-10-CM

## 2019-06-20 NOTE — PROGRESS NOTES
Subjective:      Radha Huynh is a 34 y.o. male is here for EP consult. He was in AF in April at his PCPs office. Since then, he has cut out caffeine and is working on weight loss. Admits to snoring at night. One episode of palpitations 2 weeks ago. Patient Active Problem List    Diagnosis Date Noted    Paroxysmal atrial fibrillation ([address] 05/28/2019    Persistent atrial fibrillation ([address] 04/29/2019    Impaired glucose tolerance 05/21/2018    Class 3 severe obesity due to excess calories without serious comorbidity with body mass index (BMI) of 40.0 to 44.9 in adult [facility]) 04/22/2018    Class 2 obesity in adult 07/12/2013    Asthma 09/17/2012    HTN (hypertension) 09/17/2012      Miquel Rodriguez MD  Past Medical History:   Diagnosis Date    Asthma     Asthma     History of seasonal allergies     HTN (hypertension)     Obesity       History reviewed. No pertinent surgical history.   No Known Allergies   Family History   Problem Relation Age of Onset    Hypertension Father     Diabetes Maternal Grandmother     Hypertension Maternal Grandmother     Heart Disease Maternal Grandfather     Hypertension Maternal Grandfather     Hypertension Paternal Grandmother     Hypertension Paternal Grandfather     Stroke Paternal Grandfather     Heart Disease Paternal Grandfather     negative for cardiac disease  Social History     Socioeconomic History    Marital status: SINGLE     Spouse name: Not on file    Number of children: Not on file    Years of education: Not on file    Highest education level: Not on file   Tobacco Use    Smoking status: Never Smoker    Smokeless tobacco: Never Used   Substance and Sexual Activity    Alcohol use: Yes     Comment: occassionally    Drug use: No    Sexual activity: Yes     Partners: Female     Current Outpatient Medications   Medication Sig    albuterol (PROVENTIL VENTOLIN) 2.5 mg /3 mL (0.083 %) nebulizer solution 3 mL by Nebulization route every four (4) hours as needed for Wheezing.  amLODIPine (NORVASC) 10 mg tablet Take 1 Tab by mouth daily.  fluticasone (FLONASE ALLERGY RELIEF) 50 mcg/actuation nasal spray 2 Sprays by Both Nostrils route daily.  fexofenadine (ALLEGRA) 180 mg tablet Take  by mouth daily.  albuterol (PROVENTIL HFA, VENTOLIN HFA) 90 mcg/actuation inhaler Take 1 Puff by inhalation every four (4) hours as needed for Wheezing. No current facility-administered medications for this visit. Vitals:    06/20/19 1034 06/20/19 1044   BP: 110/70 120/80   Pulse: 76    Resp: 16    SpO2: 97%    Weight: 289 lb 9.6 oz (131.4 kg)    Height: 5' 11\" (1.803 m)        I have reviewed the nurses notes, vitals, problem list, allergy list, medical history, family, social history and medications. Review of Symptoms:    General: Pt denies excessive weight gain or loss. Pt is able to conduct ADL's  HEENT: Denies blurred vision, headaches, epistaxis and difficulty swallowing. Respiratory: Denies shortness of breath, ROLAND, wheezing or stridor. Cardiovascular: +palpitations, Denies precordial pain, edema or PND  Gastrointestinal: Denies poor appetite, indigestion, abdominal pain or blood in stool  Urinary: Denies dysuria, pyuria  Musculoskeletal: Denies pain or swelling from muscles or joints  Neurologic: Denies tremor, paresthesias, or sensory motor disturbance  Skin: Denies rash, itching or texture change. Psych: Denies depression      Physical Exam:      General: Well developed, in no acute distress. HEENT: Eyes - PERRL, no jvd  Heart:  Normal S1/S2 negative S3 or S4. Regular, no murmur, gallop or rub.   Respiratory: Clear bilaterally x 4, no wheezing or rales  Abdomen:   Soft, non-tender, bowel sounds are active.   Extremities:  No edema, normal cap refill, no cyanosis. Musculoskeletal: No clubbing  Neuro: A&Ox3, speech clear, gait stable. Skin: Skin color is normal. No rashes or lesions.  Non diaphoretic  Vascular: 2+ pulses symmetric in all extremities    Cardiographics    Ekg: nsr    No results found for this or any previous visit. Lab Results   Component Value Date/Time    WBC 5.7 04/29/2019 12:09 PM    HGB 15.4 04/29/2019 12:09 PM    HCT 44.8 04/29/2019 12:09 PM    PLATELET 260 02/93/5483 12:09 PM    MCV 85 04/29/2019 12:09 PM      Lab Results   Component Value Date/Time    Sodium 142 04/29/2019 12:09 PM    Potassium 4.2 04/29/2019 12:09 PM    Chloride 104 04/29/2019 12:09 PM    CO2 25 04/29/2019 12:09 PM    Glucose 94 04/29/2019 12:09 PM    BUN 8 04/29/2019 12:09 PM    Creatinine 0.80 04/29/2019 12:09 PM    BUN/Creatinine ratio 10 04/29/2019 12:09 PM    GFR est  04/29/2019 12:09 PM    GFR est non- 04/29/2019 12:09 PM    Calcium 9.4 04/29/2019 12:09 PM    Bilirubin, total 0.4 04/29/2019 12:09 PM    AST (SGOT) 22 04/29/2019 12:09 PM    Alk. phosphatase 108 04/29/2019 12:09 PM    Protein, total 7.2 04/29/2019 12:09 PM    Albumin 4.4 04/29/2019 12:09 PM    A-G Ratio 1.6 04/29/2019 12:09 PM    ALT (SGPT) 37 04/29/2019 12:09 PM         Assessment:     Assessment:        ICD-10-CM ICD-9-CM    1. Paroxysmal atrial fibrillation (HCC) I48.0 427.31 AMB POC EKG ROUTINE W/ 12 LEADS, INTER & REP      REFERRAL TO SLEEP STUDIES   2. Class 3 severe obesity due to excess calories without serious comorbidity with body mass index (BMI) of 40.0 to 44.9 in adult (HCC) E66.01 278.01 REFERRAL TO SLEEP STUDIES    Z68.41 V85.41    3.  Essential hypertension I10 401.9 REFERRAL TO SLEEP STUDIES     Orders Placed This Encounter    REFERRAL TO SLEEP STUDIES     Standing Status:   Future     Standing Expiration Date:   8/20/2019     Referral Priority:   Routine     Referral Type:   Consultation     Referral Reason:   Specialty Services Required     Referred to Provider:   Augusto Lowe MD     Requested Specialty:   Sleep Medicine     Number of Visits Requested:   1    AMB POC EKG ROUTINE W/ 12 LEADS, INTER & REP     Order Specific Question:   Reason for Exam:     Answer:   Routine        Plan:   Mr Colleen Dunne is a pleasant young man with paf and htn. He is a chadsvasc of 1. He can stop his oac. We will refer him for a sleep study to r/o dale. He states that he has not had symptoms since cutting out caffeine. Encouraged to exercise and work on diet to help promote weight loss. lvef is wnl and stress test wnl. He can f/u with Dr Pearl Alvarez. If he has recurrent symptoms, we can consider aads vs abl. Continue medical management for htn. Thank you for allowing me to participate in Edgar NjEmeterio 's care.     Kirk Suazo MD, [facility]

## 2019-06-20 NOTE — PROGRESS NOTES
1. Have you been to the ER, urgent care clinic since your last visit? Hospitalized since your last visit? No    2. Have you seen or consulted any other health care providers outside of the [address] [place] [address] since your last visit? Include any pap smears or colon screening.  No     Chief Complaint   Patient presents with   [address] [place] [address]     new patient; referred by Dr. Winter Samples; newly dx afib    Irregular Heart Beat    Palpitations     occasionally

## 2019-06-25 ENCOUNTER — OFFICE VISIT (OUTPATIENT)
Dept: CARDIOLOGY CLINIC | Age: 29
End: 2019-06-25

## 2019-06-25 VITALS
BODY MASS INDEX: 40.04 KG/M2 | WEIGHT: 286 LBS | OXYGEN SATURATION: 97 % | HEART RATE: 61 BPM | SYSTOLIC BLOOD PRESSURE: 126 MMHG | HEIGHT: 71 IN | RESPIRATION RATE: 16 BRPM | DIASTOLIC BLOOD PRESSURE: 86 MMHG

## 2019-06-25 DIAGNOSIS — R73.02 IMPAIRED GLUCOSE TOLERANCE: ICD-10-CM

## 2019-06-25 DIAGNOSIS — I48.0 PAROXYSMAL ATRIAL FIBRILLATION (HCC): Primary | ICD-10-CM

## 2019-06-25 DIAGNOSIS — E66.01 CLASS 3 SEVERE OBESITY DUE TO EXCESS CALORIES WITHOUT SERIOUS COMORBIDITY WITH BODY MASS INDEX (BMI) OF 40.0 TO 44.9 IN ADULT (HCC): ICD-10-CM

## 2019-06-25 DIAGNOSIS — R03.0 PREHYPERTENSION: ICD-10-CM

## 2019-06-25 RX ORDER — APIXABAN 5 MG/1
TABLET, FILM COATED ORAL
Refills: 3 | COMMUNITY
Start: 2019-05-25

## 2019-06-25 NOTE — PROGRESS NOTES
Chief Complaint   Patient presents with    Follow-up    Irregular Heart Beat    Hypertension     1. Have you been to the ER, urgent care clinic since your last visit? Hospitalized since your last visit? No    2. Have you seen or consulted any other health care providers outside of the [address] [place] [address] since your last visit? Include any pap smears or colon screening.  No

## 2019-06-25 NOTE — PROGRESS NOTES
[facility]   [address] [facility], [address]                                          NEW PATIENT HPI/FOLLOW-UP      NAME:  Esther Hubbard. :   1990   MRN:   [id number]   PCP:  Justine Wesley MD           Subjective: The patient is a 34y.o. year old male  who returns for a routine follow-up. Since the last visit, patient reports no new symptoms. No recurrent palpitations. EP eval recently. Eliquis stopped. Has [facility] evaluation scheduled. Working 3 jobs. Had cut out caffeine. Denies change in exercise tolerance, chest pain, edema, medication intolerance, shortness of breath, PND/orthopnea wheezing, sputum, syncope, dizziness or light headedness. Doing satisfactorily. Review of Systems  General: Pt denies excessive weight gain or loss. Pt is able to conduct ADL's. Respiratory: Denies shortness of breath, ROLAND, wheezing or stridor. Cardiovascular: Denies precordial pain, palpitations, edema or PND  Gastrointestinal: Denies poor appetite, indigestion, abdominal pain or blood in stool  Peripheral vascular: Denies claudication, leg cramps  Neuropsychiatric: Denies paresthesias,tingling,numbness,anxiety,depression,fatigue  Musculoskeletal: Denies pain,tenderness, soreness,swelling      Past Medical History:   Diagnosis Date    Asthma     Asthma     History of seasonal allergies     HTN (hypertension)     Obesity      Patient Active Problem List    Diagnosis Date Noted    Paroxysmal atrial fibrillation ([facility] [address] 2019    Persistent atrial fibrillation ([facility] [address] 2019    Impaired glucose tolerance 2018    Class 3 severe obesity due to excess calories without serious comorbidity with body mass index (BMI) of 40.0 to 44.9 in adult [facility]) 2018    Class 2 obesity in adult 2013    Asthma 2012    HTN (hypertension) 2012      History reviewed. No pertinent surgical history.   No Known Allergies   Family History Problem Relation Age of Onset    Hypertension Father     Diabetes Maternal Grandmother     Hypertension Maternal Grandmother     Heart Disease Maternal Grandfather     Hypertension Maternal Grandfather     Hypertension Paternal Grandmother     Hypertension Paternal Grandfather     Stroke Paternal Grandfather     Heart Disease Paternal Grandfather       Social History     Socioeconomic History    Marital status: SINGLE     Spouse name: Not on file    Number of children: Not on file    Years of education: Not on file    Highest education level: Not on file   Occupational History    Not on file   Social Needs    Financial resource strain: Not on file    Food insecurity:     Worry: Not on file     Inability: Not on file    Transportation needs:     Medical: Not on file     Non-medical: Not on file   Tobacco Use    Smoking status: Never Smoker    Smokeless tobacco: Never Used   Substance and Sexual Activity    Alcohol use: Yes     Comment: occassionally    Drug use: No    Sexual activity: Yes     Partners: Female   Lifestyle    Physical activity:     Days per week: Not on file     Minutes per session: Not on file    Stress: Not on file   Relationships    Social connections:     Talks on phone: Not on file     Gets together: Not on file     Attends [employer] service: Not on file     Active member of club or organization: Not on file     Attends meetings of clubs or organizations: Not on file     Relationship status: Not on file    Intimate partner violence:     Fear of current or ex partner: Not on file     Emotionally abused: Not on file     Physically abused: Not on file     Forced sexual activity: Not on file   Other Topics Concern    Not on file   Social History Narrative    Not on file      Current Outpatient Medications   Medication Sig    ELIQUIS 5 mg tablet TAKE 1 TABLET BY MOUTH TWICE A DAY    albuterol (PROVENTIL VENTOLIN) 2.5 mg /3 mL (0.083 %) nebulizer solution 3 mL by Nebulization route every four (4) hours as needed for Wheezing.  amLODIPine (NORVASC) 10 mg tablet Take 1 Tab by mouth daily.  fluticasone (FLONASE ALLERGY RELIEF) 50 mcg/actuation nasal spray 2 Sprays by Both Nostrils route daily.  fexofenadine (ALLEGRA) 180 mg tablet Take  by mouth daily.  albuterol (PROVENTIL HFA, VENTOLIN HFA) 90 mcg/actuation inhaler Take 1 Puff by inhalation every four (4) hours as needed for Wheezing. No current facility-administered medications for this visit. I have reviewed the nurses notes, vitals, problem list, allergy list, medical history, family medical, social history and medications. Objective:     Physical Exam:     Vitals:    06/25/19 1105   BP: 126/86   Pulse: 61   Resp: 16   SpO2: 97%   Weight: 286 lb (129.7 kg)   Height: 5' 11\" (1.803 m)    Body mass index is 39.89 kg/m². General: Well developed, obese,in no acute distress. HEENT: No carotid bruits, no JVD, trach is midline. Heart:  Normal S1/S2 negative S3 or S4. Regular, no murmur, gallop or rub.   Respiratory: Clear bilaterally, no wheezing or rales  Abdomen:   Soft, non-tender, bowel sounds are active.   Extremities:  No edema, normal cap refill, no cyanosis. Neuro: A&Ox3, speech clear, gait stable. Skin: Skin color is normal. No rashes or lesions. No diaphoresis. Vascular: 2+ pulses symmetric in all extremities        Data Review:       Cardiographics:    EKG: not needed. Cardiology Labs:    No results found for this or any previous visit.     Lab Results   Component Value Date/Time    Cholesterol, total 192 04/29/2019 12:09 PM    HDL Cholesterol 49 04/29/2019 12:09 PM    LDL, calculated 125 (H) 04/29/2019 12:09 PM    Triglyceride 89 04/29/2019 12:09 PM       Lab Results   Component Value Date/Time    Sodium 142 04/29/2019 12:09 PM    Potassium 4.2 04/29/2019 12:09 PM    Chloride 104 04/29/2019 12:09 PM    CO2 25 04/29/2019 12:09 PM    Glucose 94 04/29/2019 12:09 PM    BUN 8 04/29/2019 12:09 PM    Creatinine 0.80 04/29/2019 12:09 PM    BUN/Creatinine ratio 10 04/29/2019 12:09 PM    GFR est  04/29/2019 12:09 PM    GFR est non- 04/29/2019 12:09 PM    Calcium 9.4 04/29/2019 12:09 PM    Bilirubin, total 0.4 04/29/2019 12:09 PM    AST (SGOT) 22 04/29/2019 12:09 PM    Alk. phosphatase 108 04/29/2019 12:09 PM    Protein, total 7.2 04/29/2019 12:09 PM    Albumin 4.4 04/29/2019 12:09 PM    A-G Ratio 1.6 04/29/2019 12:09 PM    ALT (SGPT) 37 04/29/2019 12:09 PM          Assessment:       ICD-10-CM ICD-9-CM    1. Paroxysmal atrial fibrillation (HCC) I48.0 427.31 AMB POC EKG ROUTINE W/ 12 LEADS, INTER & REP   2. Prehypertension R03.0 796.2 AMB POC EKG ROUTINE W/ 12 LEADS, INTER & REP   3. Impaired glucose tolerance R73.02 790.22    4. Class 3 severe obesity due to excess calories without serious comorbidity with body mass index (BMI) of 40.0 to 44.9 in adult [facility]) E66.01 278.01     Z68.41 V85.41          Discussion: Patient presents at this time stable from a cardiac perspective. No palpitations. Advised to stop Eliquis per EP as stoke risk < 1(pre-hypertensive). Has appt with Sleep Clinic--intentionally losing weight. Call if recurrent palpitations. Advised to keep BP diary. May drop dose of Amlodipine to 2.5 mg every day if BP < 130/80 and note response. Pleased with present cardiac status. Plan: 1. Continue same meds. Lipid profile and labs followed by PCP. 2.Encouraged to exercise to tolerance, lose weight--seeking to get down to 240 or less and follow low fat, low cholesterol, low sodium predominantly Plant-based (consider Mediterranean) diet. Call with questions or concerns. Will follow up any test results by phone and/or f/u here in office if needed. Rafaela Fagan 3.Follow up: 3 months    I have discussed the diagnosis with the patient and the intended plan as seen in the above orders.   The patient has received an after-visit summary and questions were answered concerning future plans. I have discussed any concerning medication side effects and warnings with the patient as well.     George Pennington MD  6/25/2019

## 2019-08-28 DIAGNOSIS — I10 ESSENTIAL HYPERTENSION: ICD-10-CM

## 2019-08-28 RX ORDER — AMLODIPINE BESYLATE 10 MG/1
10 TABLET ORAL DAILY
Qty: 30 TAB | Refills: 11 | Status: SHIPPED | OUTPATIENT
Start: 2019-08-28 | End: 2020-11-08

## 2020-03-28 RX ORDER — ALBUTEROL SULFATE 0.83 MG/ML
SOLUTION RESPIRATORY (INHALATION)
Qty: 150 ML | Refills: 11 | Status: SHIPPED | OUTPATIENT
Start: 2020-03-28